# Patient Record
Sex: MALE | Race: WHITE | NOT HISPANIC OR LATINO
[De-identification: names, ages, dates, MRNs, and addresses within clinical notes are randomized per-mention and may not be internally consistent; named-entity substitution may affect disease eponyms.]

---

## 2020-10-30 ENCOUNTER — TRANSCRIPTION ENCOUNTER (OUTPATIENT)
Age: 80
End: 2020-10-30

## 2021-03-15 PROBLEM — Z00.00 ENCOUNTER FOR PREVENTIVE HEALTH EXAMINATION: Status: ACTIVE | Noted: 2021-03-15

## 2021-03-16 ENCOUNTER — APPOINTMENT (OUTPATIENT)
Dept: ENDOCRINOLOGY | Facility: CLINIC | Age: 81
End: 2021-03-16
Payer: MEDICARE

## 2021-03-16 VITALS
HEART RATE: 62 BPM | SYSTOLIC BLOOD PRESSURE: 122 MMHG | WEIGHT: 162 LBS | BODY MASS INDEX: 26.03 KG/M2 | DIASTOLIC BLOOD PRESSURE: 62 MMHG | HEIGHT: 66 IN

## 2021-03-16 DIAGNOSIS — U07.1 COVID-19: ICD-10-CM

## 2021-03-16 DIAGNOSIS — Z63.5 DISRUPTION OF FAMILY BY SEPARATION AND DIVORCE: ICD-10-CM

## 2021-03-16 DIAGNOSIS — Z86.39 PERSONAL HISTORY OF OTHER ENDOCRINE, NUTRITIONAL AND METABOLIC DISEASE: ICD-10-CM

## 2021-03-16 DIAGNOSIS — Z83.3 FAMILY HISTORY OF DIABETES MELLITUS: ICD-10-CM

## 2021-03-16 LAB — GLUCOSE BLDC GLUCOMTR-MCNC: 99

## 2021-03-16 PROCEDURE — 82962 GLUCOSE BLOOD TEST: CPT

## 2021-03-16 PROCEDURE — 99204 OFFICE O/P NEW MOD 45 MIN: CPT | Mod: 25

## 2021-03-16 RX ORDER — HYDROCHLOROTHIAZIDE 25 MG/1
25 TABLET ORAL
Refills: 0 | Status: ACTIVE | COMMUNITY

## 2021-03-16 RX ORDER — BLOOD SUGAR DIAGNOSTIC
STRIP MISCELLANEOUS
Qty: 50 | Refills: 0 | Status: ACTIVE | COMMUNITY
Start: 2020-02-18

## 2021-03-16 RX ORDER — VALSARTAN 160 MG/1
160 TABLET, COATED ORAL
Refills: 0 | Status: ACTIVE | COMMUNITY

## 2021-03-16 RX ORDER — LANCETS
EACH MISCELLANEOUS
Qty: 100 | Refills: 0 | Status: ACTIVE | COMMUNITY
Start: 2020-02-18

## 2021-03-16 RX ORDER — METFORMIN ER 500 MG 500 MG/1
500 TABLET ORAL
Qty: 120 | Refills: 0 | Status: ACTIVE | COMMUNITY
Start: 2020-08-03

## 2021-03-16 RX ORDER — ASCORBIC ACID 500 MG
TABLET ORAL
Refills: 0 | Status: ACTIVE | COMMUNITY

## 2021-03-16 RX ORDER — ROSUVASTATIN CALCIUM 40 MG/1
40 TABLET, FILM COATED ORAL DAILY
Refills: 0 | Status: ACTIVE | COMMUNITY

## 2021-03-16 RX ORDER — GLIPIZIDE 5 MG/1
5 TABLET ORAL
Refills: 0 | Status: ACTIVE | COMMUNITY

## 2021-03-16 SDOH — SOCIAL STABILITY - SOCIAL INSECURITY: DISRUPTION OF FAMILY BY SEPARATION AND DIVORCE: Z63.5

## 2021-03-16 NOTE — HISTORY OF PRESENT ILLNESS
[FreeTextEntry1] : Here to establish care for type 2 Diabetes Mellitus. \par Transitioning care from Dr. Acevedo's office.  \[facility] (fax [phone])\par \par Quality:  Type 2 DM\par Severity:  moderate\par Duration of diabetes: over 15 years\par Associated Complications/ Symptoms:  no known microvascular complications.\par Modifying Factors:  Better with medication\par \par SMBG: testing once a day (fasting).  Most BG in the 100- 150 mg/dl range.  No recent hypoglycemia.  \par \par Current Diabetic Medication Regimen:\par Lantus 34 units qhs\par Metformin 500 mg 2 tabs daily\par Glipizide 5 mg 2 tabs daily\par [facility].  \par Has Invokana and still taking, but wants to stop due to cost.   (has $5000 allotment for drugs every year).  \par

## 2021-03-16 NOTE — PHYSICAL EXAM
[Healthy Appearance] : healthy appearance [No Acute Distress] : no acute distress [Normal Sclera/Conjunctiva] : normal sclera/conjunctiva [No Proptosis] : no proptosis [No Neck Mass] : no neck mass was observed [No LAD] : no lymphadenopathy [Supple] : the neck was supple [Thyroid Not Enlarged] : the thyroid was not enlarged [No Thyroid Nodules] : no palpable thyroid nodules [No Respiratory Distress] : no respiratory distress [Clear to Auscultation] : lungs were clear to auscultation bilaterally [Normal S1, S2] : normal S1 and S2 [No Murmurs] : no murmurs [Normal Rate] : heart rate was normal [Regular Rhythm] : with a regular rhythm [No Edema] : no peripheral edema [Right Foot Was Examined] : right foot ~C was examined [Left Foot Was Examined] : left foot ~C was examined [Normal] : normal [2+] : 2+ in the dorsalis pedis [No Tremors] : no tremors [Normal Sensation on Monofilament Testing] : normal sensation on monofilament testing of lower extremities [Normal Affect] : the affect was normal [Normal Insight/Judgement] : insight and judgment were intact [Normal Mood] : the mood was normal [Acanthosis Nigricans] : no acanthosis nigricans [Diminished Throughout Both Feet] : normal tactile sensation with monofilament testing throughout both feet

## 2021-03-16 NOTE — ASSESSMENT
[FreeTextEntry1] : 80 year old male with type 2 DM, HTN and hyperlipidemia.  His diabetes seems well controlled, but need labs to confirm.\par \par 1.  Type 2 DM-  check fasting labs now.   Since [facility] cannot [facility], will D/C as this is too costly for him to continue.  If his blood glucose levels increase off this therapy, could consider increase in dose of Metformin.   \par 2.  Hyperlipidemia-  continue Rosuvastatin.\par 3.  HTN-  continue ARB.\par \par

## 2021-03-16 NOTE — REVIEW OF SYSTEMS
[Recent Weight Gain (___ Lbs)] : no recent weight gain [Recent Weight Loss (___ Lbs)] : no recent weight loss [Chest Pain] : no chest pain [Shortness Of Breath] : no shortness of breath [Nausea] : no nausea [Diarrhea] : no diarrhea [Polyuria] : no polyuria [Muscle Cramps] : no muscle cramps [Ulcer] : no ulcer [Pain/Numbness of Digits] : no pain/numbness of digits [Insomnia] : no insomnia [Polydipsia] : no polydipsia [FreeTextEntry3] : no known DR

## 2021-04-01 RX ORDER — FENOFIBRATE 160 MG/1
160 TABLET ORAL DAILY
Qty: 90 | Refills: 1 | Status: ACTIVE | COMMUNITY
Start: 2021-04-01

## 2021-04-01 RX ORDER — INSULIN GLARGINE 100 [IU]/ML
100 INJECTION, SOLUTION SUBCUTANEOUS
Qty: 1 | Refills: 0 | Status: ACTIVE | COMMUNITY
Start: 2021-04-01

## 2021-07-20 LAB
HBA1C MFR BLD HPLC: 6.9
MICROALBUMIN/CREAT 24H UR-RTO: 465

## 2021-07-21 ENCOUNTER — APPOINTMENT (OUTPATIENT)
Dept: ENDOCRINOLOGY | Facility: CLINIC | Age: 81
End: 2021-07-21
Payer: MEDICARE

## 2021-07-21 VITALS
DIASTOLIC BLOOD PRESSURE: 62 MMHG | SYSTOLIC BLOOD PRESSURE: 120 MMHG | HEART RATE: 82 BPM | HEIGHT: 66 IN | BODY MASS INDEX: 26.03 KG/M2 | WEIGHT: 162 LBS | OXYGEN SATURATION: 98 %

## 2021-07-21 DIAGNOSIS — E11.65 TYPE 2 DIABETES MELLITUS WITH HYPERGLYCEMIA: ICD-10-CM

## 2021-07-21 DIAGNOSIS — E78.5 HYPERLIPIDEMIA, UNSPECIFIED: ICD-10-CM

## 2021-07-21 DIAGNOSIS — Z79.4 TYPE 2 DIABETES MELLITUS WITH HYPERGLYCEMIA: ICD-10-CM

## 2021-07-21 DIAGNOSIS — I10 ESSENTIAL (PRIMARY) HYPERTENSION: ICD-10-CM

## 2021-07-21 LAB — GLUCOSE BLDC GLUCOMTR-MCNC: 274

## 2021-07-21 PROCEDURE — 82962 GLUCOSE BLOOD TEST: CPT

## 2021-07-21 PROCEDURE — 99214 OFFICE O/P EST MOD 30 MIN: CPT | Mod: 25

## 2021-07-21 NOTE — ASSESSMENT
[FreeTextEntry1] : 80 year old male with type 2 DM, HTN and hyperlipidemia.  His diabetes seems well controlled.\par \par 1.  Type 2 DM-   Continue current Rx.  Check A1c now.  Will follow up with new endocrinologist in [place].  \par 2.  Hyperlipidemia-  continue Rosuvastatin and fenofibrate.  Check lipids now.   Previously took Vascepa, but cost was an issue.  \par 3.  HTN-  continue ARB.\par \par

## 2021-07-21 NOTE — PHYSICAL EXAM
[Healthy Appearance] : healthy appearance [No Acute Distress] : no acute distress [Normal Sclera/Conjunctiva] : normal sclera/conjunctiva [No Proptosis] : no proptosis [No Neck Mass] : no neck mass was observed [No LAD] : no lymphadenopathy [Supple] : the neck was supple [Thyroid Not Enlarged] : the thyroid was not enlarged [No Thyroid Nodules] : no palpable thyroid nodules [No Respiratory Distress] : no respiratory distress [Clear to Auscultation] : lungs were clear to auscultation bilaterally [Normal S1, S2] : normal S1 and S2 [No Murmurs] : no murmurs [Normal Rate] : heart rate was normal [Regular Rhythm] : with a regular rhythm [No Edema] : no peripheral edema [Normal Affect] : the affect was normal [Normal Insight/Judgement] : insight and judgment were intact [Normal Mood] : the mood was normal [Acanthosis Nigricans] : no acanthosis nigricans

## 2021-07-21 NOTE — HISTORY OF PRESENT ILLNESS
[FreeTextEntry1] : Follow up DM and dyslipidemia\par [facility] Lu (fax [phone])\par Will be moving to [place] this fall.  \par \par Quality:  Type 2 DM\par Severity:  moderate\par Duration of diabetes: over 15 years\par Associated Complications/ Symptoms:  no known microvascular complications.\par Modifying Factors:  Better with medication\par \par SMBG: testing once a day (fasting).  Most - 150 mg/dl.  No recent hypoglycemia. \par \par Current Diabetic Medication Regimen:\par Lantus 34 units qhs\par Metformin 500 mg 2 tabs daily\par Glipizide 5 mg 2 tabs daily\par [facility].  \par Invokana was too costly.  \par Started on fenofibrate last visit due to Trig of 699.

## 2021-07-21 NOTE — REVIEW OF SYSTEMS
[Recent Weight Gain (___ Lbs)] : no recent weight gain [Recent Weight Loss (___ Lbs)] : no recent weight loss [Chest Pain] : no chest pain [Shortness Of Breath] : no shortness of breath [Myalgia] : no myalgia

## 2024-03-29 NOTE — CONSULT LETTER
Message sent to MD Pickard, CLARITA Dee, and YANA Sexton regarding patients treatment plan/schedule.  Uche made aware. Patient advised to call office to confirm upcoming treatment schedule.     [Dear  ___] : Dear  [unfilled], [Courtesy Letter:] : I had the pleasure of seeing your patient, [unfilled], in my office today. [Please see my note below.] : Please see my note below. [Consult Closing:] : Thank you very much for allowing me to participate in the care of this patient.  If you have any questions, please do not hesitate to contact me. [Sincerely,] : Sincerely, [FreeTextEntry3] : Diallo Biggs MD, FACE\par Endocrinology, Diabetes and Metabolism\par , [employer] [facility]\par  [DrPierre  ___] : Dr. GARCIA